# Patient Record
Sex: MALE | Race: WHITE | URBAN - METROPOLITAN AREA
[De-identification: names, ages, dates, MRNs, and addresses within clinical notes are randomized per-mention and may not be internally consistent; named-entity substitution may affect disease eponyms.]

---

## 2019-01-24 ENCOUNTER — OFFICE VISIT (OUTPATIENT)
Dept: FAMILY MEDICINE | Facility: CLINIC | Age: 24
End: 2019-01-24
Payer: COMMERCIAL

## 2019-01-24 VITALS
BODY MASS INDEX: 29.37 KG/M2 | HEIGHT: 67 IN | TEMPERATURE: 97.8 F | RESPIRATION RATE: 14 BRPM | HEART RATE: 70 BPM | OXYGEN SATURATION: 99 % | DIASTOLIC BLOOD PRESSURE: 84 MMHG | WEIGHT: 187.1 LBS | SYSTOLIC BLOOD PRESSURE: 134 MMHG

## 2019-01-24 DIAGNOSIS — R21 RASH AND NONSPECIFIC SKIN ERUPTION: Primary | ICD-10-CM

## 2019-01-24 PROCEDURE — 99203 OFFICE O/P NEW LOW 30 MIN: CPT | Performed by: PHYSICIAN ASSISTANT

## 2019-01-24 SDOH — HEALTH STABILITY: MENTAL HEALTH: HOW OFTEN DO YOU HAVE A DRINK CONTAINING ALCOHOL?: NEVER

## 2019-01-24 ASSESSMENT — MIFFLIN-ST. JEOR: SCORE: 1792.31

## 2019-01-24 NOTE — PROGRESS NOTES
"  SUBJECTIVE:   Agustin Thompson is a 23 year old male who presents to clinic today for the following health issues:    Rash    Duration: 3 weeks     Description  Location: arms and sides   Itching: mild    Intensity:  none    Accompanying signs and symptoms: None    History (similar episodes/previous evaluation): None    Precipitating or alleviating factors:  New exposures:  None  Recent travel: no      Therapies tried and outcome: Anti-fungal cream     -Patient is a 24yo male who presents with a 3 week hx of rash  -he had a friend who first noted a rash and told him it was ring worm   -he notes he tried some anti-fungal creams which seemed to help minimally  -first noted on his bicep, then to the trunk  -there is slight itchiness  -they do seem to change from slight speckling to larger lesions    -there are no new meds, soaps or foods  -no recent travel    Problem list and histories reviewed & adjusted, as indicated.  Additional history: as documented    There is no problem list on file for this patient.    History reviewed. No pertinent surgical history.    Social History     Tobacco Use     Smoking status: Never Smoker     Smokeless tobacco: Never Used   Substance Use Topics     Alcohol use: Yes     Frequency: Never     History reviewed. No pertinent family history.        Reviewed and updated as needed this visit by clinical staff       Reviewed and updated as needed this visit by Provider         ROS:  Constitutional, HEENT, cardiovascular, pulmonary, gi and gu systems are negative, except as otherwise noted.    OBJECTIVE:     /84   Pulse 70   Temp 97.8  F (36.6  C) (Tympanic)   Resp 14   Ht 1.702 m (5' 7\")   Wt 84.9 kg (187 lb 1.6 oz)   SpO2 99%   BMI 29.30 kg/m    Body mass index is 29.3 kg/m .  GENERAL: healthy, alert and no distress  SKIN: small annular scaly lesions to the proximal extremities and tunrk. Some early papular lesions along the proximal extremities    Diagnostic Test " Results:  none     ASSESSMENT/PLAN:   1. Rash and nonspecific skin eruption  Favor pityriasis rosea. Consider trial of hydrocortisone to help with any itching but will have derm evaluate as well.   - DERMATOLOGY REFERRAL      Luis Craig PA-C  [facility]